# Patient Record
Sex: FEMALE | Race: BLACK OR AFRICAN AMERICAN | URBAN - METROPOLITAN AREA
[De-identification: names, ages, dates, MRNs, and addresses within clinical notes are randomized per-mention and may not be internally consistent; named-entity substitution may affect disease eponyms.]

---

## 2018-05-11 ENCOUNTER — HOSPITAL ENCOUNTER (OUTPATIENT)
Dept: LAB | Age: 23
Discharge: HOME | End: 2018-05-11
Payer: SELF-PAY

## 2018-05-11 ENCOUNTER — OFFICE VISIT (OUTPATIENT)
Dept: FAMILY MEDICINE CLINIC | Age: 23
End: 2018-05-11

## 2018-05-11 VITALS
HEIGHT: 61 IN | BODY MASS INDEX: 23.41 KG/M2 | OXYGEN SATURATION: 98 % | WEIGHT: 124 LBS | SYSTOLIC BLOOD PRESSURE: 80 MMHG | TEMPERATURE: 96.7 F | RESPIRATION RATE: 18 BRPM | DIASTOLIC BLOOD PRESSURE: 65 MMHG | HEART RATE: 80 BPM

## 2018-05-11 DIAGNOSIS — R79.89 ABNORMAL CBC: ICD-10-CM

## 2018-05-11 DIAGNOSIS — R63.4 WEIGHT LOSS: Primary | ICD-10-CM

## 2018-05-11 DIAGNOSIS — N92.1 PROLONGED MENSTRUATION: ICD-10-CM

## 2018-05-11 DIAGNOSIS — R11.10 CHRONIC VOMITING: ICD-10-CM

## 2018-05-11 DIAGNOSIS — R79.1 PROLONGED INR: ICD-10-CM

## 2018-05-11 DIAGNOSIS — R63.4 WEIGHT LOSS: ICD-10-CM

## 2018-05-11 LAB
HCG URINE, QL. (POC): NEGATIVE
VALID INTERNAL CONTROL?: YES

## 2018-05-11 PROCEDURE — 84703 CHORIONIC GONADOTROPIN ASSAY: CPT | Performed by: FAMILY MEDICINE

## 2018-05-11 PROCEDURE — 85025 COMPLETE CBC W/AUTO DIFF WBC: CPT | Performed by: FAMILY MEDICINE

## 2018-05-11 PROCEDURE — 84439 ASSAY OF FREE THYROXINE: CPT | Performed by: FAMILY MEDICINE

## 2018-05-11 PROCEDURE — 80053 COMPREHEN METABOLIC PANEL: CPT | Performed by: FAMILY MEDICINE

## 2018-05-11 PROCEDURE — 85610 PROTHROMBIN TIME: CPT | Performed by: FAMILY MEDICINE

## 2018-05-11 PROCEDURE — 85730 THROMBOPLASTIN TIME PARTIAL: CPT | Performed by: FAMILY MEDICINE

## 2018-05-11 PROCEDURE — 84443 ASSAY THYROID STIM HORMONE: CPT | Performed by: FAMILY MEDICINE

## 2018-05-11 NOTE — MR AVS SNAPSHOT
[address] [address] 
[phone] Patient: Constanza Sotelo MRN: [id number] ZBD:[id number] Visit Information Date & Time Provider Department Dept. Phone Encounter #  
 5/11/2018 10:45 AM Katelin Nunez, [employer] [phone] [id number] Follow-up Instructions Return if symptoms worsen or fail to improve. Upcoming Health Maintenance Date Due  
 HPV Age 9Y-34Y (1 of 1 - Female 3 Dose Series) 11/15/2006 DTaP/Tdap/Td series (1 - Tdap) 11/15/2016 PAP AKA CERVICAL CYTOLOGY 11/15/2016 Influenza Age 5 to Adult 8/1/2018 Allergies as of 5/11/2018  Review Complete On: 5/11/2018 By: Katelin Nunez MD  
  
 Severity Noted Reaction Type Reactions Penicillins  05/11/2018    Swelling Current Immunizations  Never Reviewed No immunizations on file. Not reviewed this visit You Were Diagnosed With   
  
 Codes Comments Weight loss    -  Primary ICD-10-CM: R63.4 ICD-9-CM: 783.21 Prolonged menstruation     ICD-10-CM: N92.1 ICD-9-CM: 626.2 Chronic vomiting     ICD-10-CM: R11.10 ICD-9-CM: 787.03 Vitals BP Pulse Temp Resp Height(growth percentile) Weight(growth percentile) (!) 80/65 (BP 1 Location: Left arm, BP Patient Position: Sitting) 80 96.7 °F (35.9 °C) (Oral) 18 5' 1\" (1.549 m) 124 lb (56.2 kg) LMP SpO2 BMI OB Status Smoking Status 03/30/2018 (Exact Date) 98% 23.43 kg/m2 Having regular periods Never Smoker Vitals History BMI and BSA Data Body Mass Index Body Surface Area  
 23.43 kg/m 2 1.56 m 2 Preferred Pharmacy Pharmacy Name Phone [id number], [address] [place] [address] Yamileth Hamm [phone] Your Updated Medication List  
  
Notice  As of 5/11/2018 12:03 PM  
 You have not been prescribed any medications. We Performed the Following AMB POC URINE PREGNANCY TEST, VISUAL COLOR COMPARISON [89855 CPT(R)] REFERRAL TO GASTROENTEROLOGY [LPI32 Custom] Comments:  
 Please evaluate patient for chronic nausea and vomiting. REFERRAL TO OBSTETRICS AND GYNECOLOGY [REF51 Custom] Comments:  
 SOONEST AVAILABLE, ANY PROVIDER, ANY OFFICE Follow-up Instructions Return if symptoms worsen or fail to improve. Referral Information Referral ID Referred By Referred To  
  
 [id number] J[facility] Alberto [facility]   
   [address] [facility], [address] [place] [address] Phone: [phone] Fax: [phone] Visits Status Start Date End Date 1 Authorized/Scheduled 5/11/18 5/11/19 If your referral has a status of pending review or denied, additional information will be sent to support the outcome of this decision. Referral ID Referred By Referred To  
 [id number] MAEVE, [address], [place]  
   [address] [place] [address] Phone: [phone] Fax: [phone] Visits Status Start Date End Date 1 New Request 5/11/18 5/11/19 If your referral has a status of pending review or denied, additional information will be sent to support the outcome of this decision. Patient Instructions Follow up on lab results in 1-2 days. If you sign up for \"My Chart\" you will be able to see the results online, and if you have any questions you can send me an e-mail. I have referred you to gynecology and to gastroenterology, they will be contacting you. If anything gets worse, go to the ER. Introducing [facility]! [employer] introduces [employer] patient portal. Now you can access parts of your medical record, email your doctor's office, and request medication refills online. 1. In your internet browser, go to [email or web address]. "[employer] 2. Click on the First Time User? Click Here link in the Sign In box.  You will see the New Member Sign Up page. 3. Enter your [employer] Access Code exactly as it appears below. You will not need to use this code after youve completed the sign-up process. If you do not sign up before the expiration date, you must request a new code. · [employer] Access Code: [email or web address] Expires: 8/9/2018 12:03 PM 
 
4. Enter the last four digits of your Social Security Number (xxxx) and Date of Birth (mm/dd/yyyy) as indicated and click Submit. You will be taken to the next sign-up page. 5. Create a [employer] ID. This will be your [employer] login ID and cannot be changed, so think of one that is secure and easy to remember. 6. Create a [employer] password. You can change your password at any time. 7. Enter your Password Reset Question and Answer. This can be used at a later time if you forget your password. 8. Enter your e-mail address. You will receive e-mail notification when new information is available in [address] 9. Click Sign Up. You can now view and download portions of your medical record. 10. Click the Download Summary menu link to download a portable copy of your medical information. If you have questions, please visit the Frequently Asked Questions section of the [employer] website. Remember, [employer] is NOT to be used for urgent needs. For medical emergencies, dial 911. Now available from your iPhone and Android! Please provide this summary of care documentation to your next provider. If you have any questions after today's visit, please call [phone].

## 2018-05-11 NOTE — PROGRESS NOTES
HISTORY OF PRESENT ILLNESS  Marcos Hall is a 25 y.o. female. HPI Comments: Ms. Isadora Richard presents with a 2 mo of constant heavy, bright red and dark red vaginal bleeding that requires 12 tampons and maxi pad changes per day. The vaginal bleeding stopped for 1 day last week, during which time she had white mucus discharge, then bleeding returned. She states that she was bleeding through her pants at work yesterday, had pelvic pressure and cramping last night that \"felt like she was in labor\", and passed several clots and a large mass of tissue this morning which made her come into the office. Ms. Isadora Richard moved to this area from [place], [place], 3 mo. Ago. She has had 3 children via , the last child was delivered at 34 wks in May, 2017. She also states that she had Intrahepatic Cholestasis of Pregnancy with most recent pregnancy. She states that menses returned and had regular periods in , July, and , then no menses in Sept-Dec., 2017. Menses returned in . She stopped breastfeeding in . She has also been vomiting daily for the past month and has lost 25 lbs. The only food she can keep down is broth. No diarrhea. Menstrual Problem   Pertinent negatives include no chest pain, no abdominal pain, no headaches and no shortness of breath. Weight Loss   Pertinent negatives include no chest pain, no abdominal pain, no headaches and no shortness of breath. Review of Systems   Constitutional: Positive for weight loss. Negative for chills and fever. Eyes: Negative for blurred vision and double vision. Respiratory: Negative for cough and shortness of breath. Cardiovascular: Negative for chest pain and palpitations. Gastrointestinal: Negative for abdominal pain, nausea and vomiting. Genitourinary: Positive for menstrual problem. Negative for dysuria and urgency. Neurological: Negative for headaches.    Endo/Heme/Allergies:        See HPI       Physical Exam Constitutional: Vital signs are normal. She appears well-developed and well-nourished. HENT:   Right Ear: Tympanic membrane and ear canal normal.   Left Ear: Tympanic membrane and ear canal normal.   Nose: Nose normal.   Mouth/Throat: Uvula is midline, oropharynx is clear and moist and mucous membranes are normal.   Eyes: Pupils are equal, round, and reactive to light. Conjunctiva appear normal   Cardiovascular: Normal rate, regular rhythm and normal heart sounds. BP supine: 92/62,  pulse 72  BP standing  116/66, pulse 92   Pulmonary/Chest: Effort normal and breath sounds normal. No respiratory distress. She has no wheezes. She has no rales. Abdominal: She exhibits no distension. There is no rebound and no guarding. Slight tenderness diffusely   Neurological: No cranial nerve deficit. Skin: No rash noted. Psychiatric: She has a normal mood and affect. Her behavior is normal.   Nursing note and vitals reviewed. Results for orders placed or performed in visit on 05/11/18   AMB POC URINE PREGNANCY TEST, VISUAL COLOR COMPARISON   Result Value Ref Range    VALID INTERNAL CONTROL POC Yes     HCG urine, Ql. (POC) Negative Negative       ASSESSMENT and PLAN    ICD-10-CM ICD-9-CM    1. Weight loss R63.4 783.21 CBC WITH AUTOMATED DIFF      METABOLIC PANEL, COMPREHENSIVE      TSH 3RD GENERATION      T4, FREE      REFERRAL TO GASTROENTEROLOGY   2. Prolonged menstruation N92.1 626.2 CBC WITH AUTOMATED DIFF      METABOLIC PANEL, COMPREHENSIVE      TSH 3RD GENERATION      T4, FREE      AMB POC URINE PREGNANCY TEST, VISUAL COLOR COMPARISON      HCG QL SERUM      PROTHROMBIN TIME + INR      PTT      REFERRAL TO OBSTETRICS AND GYNECOLOGY   3. Chronic vomiting R11.10 787.03 REFERRAL TO GASTROENTEROLOGY       See orders.

## 2018-05-11 NOTE — LETTER
NOTIFICATION RETURN TO WORK / SCHOOL 
 
5/11/2018 12:19 PM 
 
Ms. Marjo Dance [id number] [facility] To Whom It May Concern: 
 
Marjo Dance is currently under the care of [facility]. She will return to work/school on: 5/14/18 If there are questions or concerns please have the patient contact our office. Sincerely, Seth Nielsen MD

## 2018-05-11 NOTE — PATIENT INSTRUCTIONS
Follow up on lab results in 1-2 days. If you sign up for \"My Chart\" you will be able to see the results online, and if you have any questions you can send me an e-mail. I have referred you to gynecology and to gastroenterology, they will be contacting you. If anything gets worse, go to the ER.

## 2018-05-14 LAB
ALBUMIN SERPL-MCNC: 4.3 G/DL (ref 3.4–5)
ALBUMIN/GLOB SERPL: 1.2 {RATIO} (ref 0.8–1.7)
ALP SERPL-CCNC: 128 U/L (ref 45–117)
ALT SERPL-CCNC: 13 U/L (ref 13–56)
ANION GAP SERPL CALC-SCNC: 8 MMOL/L (ref 3–18)
APTT PPP: 32.6 SEC (ref 23–36.4)
AST SERPL-CCNC: 13 U/L (ref 15–37)
BASOPHILS # BLD: 0 K/UL (ref 0–0.06)
BASOPHILS NFR BLD: 0 % (ref 0–2)
BILIRUB SERPL-MCNC: 0.2 MG/DL (ref 0.2–1)
BUN SERPL-MCNC: 10 MG/DL (ref 7–18)
BUN/CREAT SERPL: 14 (ref 12–20)
CALCIUM SERPL-MCNC: 8.8 MG/DL (ref 8.5–10.1)
CHLORIDE SERPL-SCNC: 109 MMOL/L (ref 100–108)
CO2 SERPL-SCNC: 24 MMOL/L (ref 21–32)
CREAT SERPL-MCNC: 0.71 MG/DL (ref 0.6–1.3)
DIFFERENTIAL METHOD BLD: ABNORMAL
EOSINOPHIL # BLD: 0.1 K/UL (ref 0–0.4)
EOSINOPHIL NFR BLD: 2 % (ref 0–5)
ERYTHROCYTE [DISTWIDTH] IN BLOOD BY AUTOMATED COUNT: 15.4 % (ref 11.6–14.5)
GLOBULIN SER CALC-MCNC: 3.5 G/DL (ref 2–4)
GLUCOSE SERPL-MCNC: 93 MG/DL (ref 74–99)
HCG SERPL QL: NEGATIVE
HCT VFR BLD AUTO: 40.1 % (ref 35–45)
HGB BLD-MCNC: 11.6 G/DL (ref 12–16)
INR PPP: 1.4 (ref 0.8–1.2)
LYMPHOCYTES # BLD: 1.9 K/UL (ref 0.9–3.6)
LYMPHOCYTES NFR BLD: 37 % (ref 21–52)
MCH RBC QN AUTO: 29.7 PG (ref 24–34)
MCHC RBC AUTO-ENTMCNC: 28.9 G/DL (ref 31–37)
MCV RBC AUTO: 102.8 FL (ref 74–97)
MONOCYTES # BLD: 0.2 K/UL (ref 0.05–1.2)
MONOCYTES NFR BLD: 5 % (ref 3–10)
NEUTS SEG # BLD: 3 K/UL (ref 1.8–8)
NEUTS SEG NFR BLD: 56 % (ref 40–73)
PLATELET # BLD AUTO: 269 K/UL (ref 135–420)
PMV BLD AUTO: 11.1 FL (ref 9.2–11.8)
POTASSIUM SERPL-SCNC: 3.9 MMOL/L (ref 3.5–5.5)
PROT SERPL-MCNC: 7.8 G/DL (ref 6.4–8.2)
PROTHROMBIN TIME: 16.7 SEC (ref 11.5–15.2)
RBC # BLD AUTO: 3.9 M/UL (ref 4.2–5.3)
SODIUM SERPL-SCNC: 141 MMOL/L (ref 136–145)
T4 FREE SERPL-MCNC: 1.1 NG/DL (ref 0.7–1.5)
TSH SERPL DL<=0.05 MIU/L-ACNC: 1.17 UIU/ML (ref 0.36–3.74)
WBC # BLD AUTO: 5.3 K/UL (ref 4.6–13.2)

## 2018-05-15 NOTE — PROGRESS NOTES
Advise pt that her bleeding time is slightly increased and a couple of her blood results are mildly abnormal. She should see gyn as planned and I also want to refer her to hematology